# Patient Record
Sex: FEMALE | Race: WHITE | Employment: PART TIME | URBAN - METROPOLITAN AREA
[De-identification: names, ages, dates, MRNs, and addresses within clinical notes are randomized per-mention and may not be internally consistent; named-entity substitution may affect disease eponyms.]

---

## 2019-05-23 ENCOUNTER — OFFICE VISIT (OUTPATIENT)
Dept: ENT CLINIC | Age: 54
End: 2019-05-23
Payer: COMMERCIAL

## 2019-05-23 VITALS — HEART RATE: 72 BPM | SYSTOLIC BLOOD PRESSURE: 118 MMHG | OXYGEN SATURATION: 98 % | DIASTOLIC BLOOD PRESSURE: 72 MMHG

## 2019-05-23 DIAGNOSIS — H93.11 TINNITUS OF RIGHT EAR: Primary | ICD-10-CM

## 2019-05-23 PROCEDURE — 3017F COLORECTAL CA SCREEN DOC REV: CPT | Performed by: OTOLARYNGOLOGY

## 2019-05-23 PROCEDURE — 1036F TOBACCO NON-USER: CPT | Performed by: OTOLARYNGOLOGY

## 2019-05-23 PROCEDURE — 99203 OFFICE O/P NEW LOW 30 MIN: CPT | Performed by: OTOLARYNGOLOGY

## 2019-05-23 PROCEDURE — G8421 BMI NOT CALCULATED: HCPCS | Performed by: OTOLARYNGOLOGY

## 2019-05-23 PROCEDURE — G8427 DOCREV CUR MEDS BY ELIG CLIN: HCPCS | Performed by: OTOLARYNGOLOGY

## 2019-05-23 RX ORDER — LANOLIN ALCOHOL/MO/W.PET/CERES
20 CREAM (GRAM) TOPICAL DAILY
COMMUNITY

## 2019-05-23 ASSESSMENT — ENCOUNTER SYMPTOMS
ALLERGIC/IMMUNOLOGIC NEGATIVE: 1
FACIAL SWELLING: 0
VOICE CHANGE: 0
RESPIRATORY NEGATIVE: 1
SINUS PAIN: 0
SINUS PRESSURE: 0
RHINORRHEA: 0
SORE THROAT: 0
EYES NEGATIVE: 1
TROUBLE SWALLOWING: 0

## 2019-05-23 NOTE — PROGRESS NOTES
SUBJECTIVE:    Chief Complaint   Patient presents with    Tinnitus     Ringing in the right ear for over two years. Earnest Andrea is a 48 y.o. female    Patient relates that over the course of the past 2 years she has noted tinnitus in the right ear described as a whooshing sound somewhat related to heartbeat. This is now become rather constant and seems to be affecting her hearing. Occasionally it does cause some insomnia. She denies any vertiginous episodes. She's had no family history of similar problems and no history of noise exposure or trauma. She is on no medications likely to precipitate similar problems. She does not smoke. There is no fever. No past medical history on file. Past Surgical History:   Procedure Laterality Date    HYSTERECTOMY      VAGINOPLASTY        No family history on file. Social History     Tobacco Use    Smoking status: Never Smoker    Smokeless tobacco: Never Used   Substance Use Topics    Alcohol use: No        Review of Systems:  Review of Systems   Constitutional: Negative. Negative for fever. HENT: Positive for hearing loss and tinnitus. Negative for congestion, dental problem, drooling, ear discharge, ear pain, facial swelling, mouth sores, nosebleeds, postnasal drip, rhinorrhea, sinus pressure, sinus pain, sneezing, sore throat, trouble swallowing and voice change. Eyes: Negative. Respiratory: Negative. Cardiovascular: Negative. Endocrine: Negative. Skin: Negative. Allergic/Immunologic: Negative. Neurological: Negative. Negative for dizziness and light-headedness. Hematological: Negative. Psychiatric/Behavioral: Negative. OBJECTIVE:  /72   Pulse 72   SpO2 98%   Physical Exam   Constitutional: She is oriented to person, place, and time. She appears well-developed and well-nourished. HENT:   Head: Normocephalic and atraumatic.    Right Ear: External ear normal.   Left Ear: External ear normal.   Nose: Nose normal.   Mouth/Throat: Oropharynx is clear and moist. No oropharyngeal exudate. Indirect laryngoscopy is unremarkable. Tuning fork studies indicate normal findings. Eyes: Pupils are equal, round, and reactive to light. Conjunctivae and EOM are normal.   No nystagmus   Neck: Normal range of motion. Neck supple. No tracheal deviation present. No thyromegaly present. Cardiovascular: Normal rate and regular rhythm. Pulmonary/Chest: Effort normal.   Lymphadenopathy:     She has no cervical adenopathy. Neurological: She is alert and oriented to person, place, and time. Skin: Skin is warm and dry. Psychiatric: She has a normal mood and affect. Her behavior is normal. Judgment and thought content normal.        ASSESSMENT:    Findings seem to be those of a unilateral tinnitus with possible unilateral hearing loss. PLAN:     Audiogram and tympanogram will be obtained. She wishes to have this done in [place] and we will do it at [facility].  I will delay treatment until such time as I see the results of the testing.     Eboni Palma MD

## 2019-06-17 ENCOUNTER — TELEPHONE (OUTPATIENT)
Dept: ENT CLINIC | Age: 54
End: 2019-06-17

## 2019-06-17 NOTE — TELEPHONE ENCOUNTER
Pt had hearing test done in [facility], she will have results faxed to us. Saw anne silva md in [facility], he is an ent, he wants her to have an MRI. She went there because it was more convenient. She wants to see if you think she should have an MRI?   She said they gave her the results of the hearing test.

## 2019-06-17 NOTE — TELEPHONE ENCOUNTER
If she has a hearing loss greater on one side than the other and since she does have the ringing primarily on the right side, that would indicate a need to get an MRI scan to rule out the possibility of a benign growth on the inner ear nerve. Supposing, therefore, that there hearing loss was documented on the right side, I would agree that she needs an MRI.